# Patient Record
Sex: FEMALE | URBAN - METROPOLITAN AREA
[De-identification: names, ages, dates, MRNs, and addresses within clinical notes are randomized per-mention and may not be internally consistent; named-entity substitution may affect disease eponyms.]

---

## 2020-07-20 ENCOUNTER — TELEPHONE (OUTPATIENT)
Dept: MATERNAL FETAL MEDICINE | Facility: CLINIC | Age: 40
End: 2020-07-20

## 2020-07-20 DIAGNOSIS — O09.522 MULTIGRAVIDA OF ADVANCED MATERNAL AGE IN SECOND TRIMESTER: Primary | ICD-10-CM

## 2020-07-20 NOTE — TELEPHONE ENCOUNTER
Called to schedule appt for patient. Notified patient that insurance is out of network and phone number to cost center given. PT v/u. Scheduled appt fo 8/5 and patient says she will call back if too expensive

## 2020-08-04 ENCOUNTER — TELEPHONE (OUTPATIENT)
Dept: MATERNAL FETAL MEDICINE | Facility: CLINIC | Age: 40
End: 2020-08-04

## 2020-08-05 ENCOUNTER — OFFICE VISIT (OUTPATIENT)
Dept: MATERNAL FETAL MEDICINE | Facility: CLINIC | Age: 40
End: 2020-08-05
Payer: COMMERCIAL

## 2020-08-05 VITALS
SYSTOLIC BLOOD PRESSURE: 110 MMHG | DIASTOLIC BLOOD PRESSURE: 51 MMHG | BODY MASS INDEX: 26.33 KG/M2 | WEIGHT: 158 LBS | HEIGHT: 65 IN | TEMPERATURE: 98 F

## 2020-08-05 DIAGNOSIS — Z91.89 AT RISK FOR POSTPARTUM HEMORRHAGE: ICD-10-CM

## 2020-08-05 DIAGNOSIS — Z36.89 ENCOUNTER FOR ULTRASOUND TO ASSESS FETAL GROWTH: Primary | ICD-10-CM

## 2020-08-05 DIAGNOSIS — O09.522 MULTIGRAVIDA OF ADVANCED MATERNAL AGE IN SECOND TRIMESTER: ICD-10-CM

## 2020-08-05 PROCEDURE — 76811 PR US, OB FETAL EVAL & EXAM, TRANSABDOM,FIRST GESTATION: ICD-10-PCS | Mod: ,,, | Performed by: OBSTETRICS & GYNECOLOGY

## 2020-08-05 PROCEDURE — 99203 PR OFFICE/OUTPT VISIT, NEW, LEVL III, 30-44 MIN: ICD-10-PCS | Mod: 25,,, | Performed by: OBSTETRICS & GYNECOLOGY

## 2020-08-05 PROCEDURE — 99203 OFFICE O/P NEW LOW 30 MIN: CPT | Mod: 25,,, | Performed by: OBSTETRICS & GYNECOLOGY

## 2020-08-05 PROCEDURE — 76811 OB US DETAILED SNGL FETUS: CPT | Mod: ,,, | Performed by: OBSTETRICS & GYNECOLOGY

## 2020-08-05 RX ORDER — FERROUS SULFATE 325(65) MG
325 TABLET, DELAYED RELEASE (ENTERIC COATED) ORAL
COMMUNITY

## 2020-08-05 RX ORDER — ASPIRIN 81 MG/1
81 TABLET ORAL DAILY
COMMUNITY

## 2020-08-05 RX ORDER — PRENATAL WITH FERROUS FUM AND FOLIC ACID 3080; 920; 120; 400; 22; 1.84; 3; 20; 10; 1; 12; 200; 27; 25; 2 [IU]/1; [IU]/1; MG/1; [IU]/1; MG/1; MG/1; MG/1; MG/1; MG/1; MG/1; UG/1; MG/1; MG/1; MG/1; MG/1
1 TABLET ORAL DAILY
COMMUNITY
Start: 2020-05-13

## 2020-08-05 NOTE — LETTER
August 5, 2020      Gregorio Donovan MD  [address]  [place] [address]           [facility]  [address] , [address]  [place] [address]  Phone: [phone]          Patient: Marge Stratton   MR Number: [id number]   YOB: 1980   Date of Visit: 8/5/2020       Dear Dr. Gregorio Donovan:    Thank you for referring Marge Stratton to me for evaluation. Attached you will find relevant portions of my assessment and plan of care.    If you have questions, please do not hesitate to call me. I look forward to following Marge Stratton along with you.    Sincerely,    Jose Loza MD    Enclosure  CC:  No Recipients    If you would like to receive this communication electronically, please contact [email or web address] or [phone] to request more information on [employer] access.    For providers and/or their staff who would like to refer a patient to [facility], please contact us through our one-stop-shop provider referral line, [facility], at [phone].    If you feel you have received this communication in error or would no longer like to receive these types of communications, please e-mail [email or web address]

## 2020-08-05 NOTE — PROGRESS NOTES
Chief complaint: AMA, History PP hemorrhage x 2    Provider requesting consultation: Dr. Donovan    40 y.o. [id number] 19w1d EGA.    PMH:  Past Medical History:   Diagnosis Date    Abnormal Pap smear of cervix     AMA (advanced maternal age) multigravida 35+, second trimester     History of blood transfusion     Postpartum hemorrhage, postpartum condition        PObHx:  OB History    Para Term  AB Living   6 5 5     5   SAB TAB Ectopic Multiple Live Births           5      # Outcome Date GA Lbr Anthony/2nd Weight Sex Delivery Anes PTL Lv   6 Current            5 Term 13 37w0d  3.43 kg (7 lb 9 oz) M Vag-Spont   [place]   4 Term 07 37w0d  2.807 kg (6 lb 3 oz) F Vag-Spont   [facility]LEY      Complications: Postpartum hemorrhage, postpartum condition, History of blood transfusion   3 Term 04 38w0d  3.26 kg (7 lb 3 oz) M Vag-Spont   [place]   2 Term 00 40w0d  3.345 kg (7 lb 6 oz) M Vag-Spont   [place]   1 Term 98 42w0d  3.487 kg (7 lb 11 oz) F Vag-Spont   [place]       PSH:  Past Surgical History:   Procedure Laterality Date    AUGMENTATION OF BREAST      CERVICAL BIOPSY  W/ LOOP ELECTRODE EXCISION         Family history:family history is not on file.    Social history: reports that she has quit smoking. She has never used smokeless tobacco. She reports previous alcohol use. She reports that she does not use drugs.    A detailed fetal anatomical ultrasound was completed today.  [facility] details [facility].    Age based risk for Down syndrome at this gestational age is approximately 1 in 79  . Aneuploidy screening this pregnancy estimates the risk of Down syndrome to be 1 in 10,000        Today the patient was counseled on the relationship between maternal age and genetic aneuploidy.  The patient was counseled on the risks and benefits of screening tests versus definitive genetic testing (amniocentesis). Patient was counseled about her specific age related risk of Down Syndrome. We  discussed the limitations of ultrasound in the definitive diagnosis of Down Syndrome and we discussed amniocentesis as providing definitive diagnosis.  I quoted the patient a 1 in 1000 procedure related risk of fetal loss with genetic amniocentesis. After today's consultation she  did not want to pursue genetic amniocentesis.    We also discussed non-invasive prenatal diagnosis(NIPD) for trisomy 21, 18 and 13 through free fetal DNA in maternal serum. Non-invasive prenatal diagnosis poses no fetal risks and is done through an in office blood draw. The majority of circulating cell-free fetal DNA in maternal serum comes from placental cells, therefore a small risk, similar to that seen for CVS results, exists for obtaining a result that may represent confined placental mosaicism.  Non-invasive prenatal diagnosis is available from 10 weeks gestation throughout the remainder of the pregnancy. Typically results are available within 8-10 days and are 99% sensitive and specific for trisomy 21 and trisomy 18. Results are 91 % sensitive and >99% specific for trisomy 13. There is no detailed information about the structure of chromosomes 21, 18 or 13, only the relative copy number. Follow-up diagnostic testing through CVS or amniocentesis is recommended for any abnormal result.  Her NIPD was negative.    -We also reviewed that AMA is associated with an increased risk of adverse pregnancy outcomes such as miscarriage, ectopic pregnancy, diabetes, hypertension, and other adverse outcomes. There is also an increased risk of stillibirth, particularly in women age 40 and above.    -[facility] recommends the following for women 35 and older at their [facility]:   -Detailed fetal anatomic survey at 19 to 20 weeks gestation   -Ultrasound for fetal growth at 32 to 34 weeks gestation   -In women 40 years and older at [facility], recommend weekly MVP and twice weekly NST beginning at 32 weeks gestation.   -In women 40 years and older at [facility],  recommend delivery between 39 and 40 weeks gestation.    The patient also gives a history postpartum hemorrhage in her last 2 pregnancies.  The 1st of these required a blood transfusion the last pregnancy required multiple utero tonics but did not require a blood transfusion.  She realizes that she is at significant risk for recurrence and that this risk increases as does her grand multiparity.  With the exception of increased utero tonics at time of delivery I would consider TXA at time of delivery.    The patient was given an opportunity to ask questions about management and the diease process.  She expressed an understanding of and agreement to the above impression and plan. All questions were answered to her satisfaction.  She was given contact information to the [facility] clinic to address further concerns.      The approximate physician face-to-face time was 30 minutes. The majority of the time (>50%) was spent on counseling of the patient or coordination of care.

## 2020-11-02 ENCOUNTER — PROCEDURE VISIT (OUTPATIENT)
Dept: MATERNAL FETAL MEDICINE | Facility: CLINIC | Age: 40
End: 2020-11-02
Payer: COMMERCIAL

## 2020-11-02 VITALS
TEMPERATURE: 99 F | SYSTOLIC BLOOD PRESSURE: 115 MMHG | WEIGHT: 187 LBS | BODY MASS INDEX: 31.12 KG/M2 | DIASTOLIC BLOOD PRESSURE: 53 MMHG

## 2020-11-02 DIAGNOSIS — O09.522 MULTIGRAVIDA OF ADVANCED MATERNAL AGE IN SECOND TRIMESTER: ICD-10-CM

## 2020-11-02 DIAGNOSIS — Z36.89 ENCOUNTER FOR ULTRASOUND TO ASSESS FETAL GROWTH: ICD-10-CM

## 2020-11-02 PROCEDURE — 76816 OB US FOLLOW-UP PER FETUS: CPT | Mod: ,,, | Performed by: OBSTETRICS & GYNECOLOGY

## 2020-11-02 PROCEDURE — 76816 PR  US,PREGNANT UTERUS,F/U,TRANSABD APP: ICD-10-PCS | Mod: ,,, | Performed by: OBSTETRICS & GYNECOLOGY
